# Patient Record
Sex: FEMALE | Race: WHITE | NOT HISPANIC OR LATINO
[De-identification: names, ages, dates, MRNs, and addresses within clinical notes are randomized per-mention and may not be internally consistent; named-entity substitution may affect disease eponyms.]

---
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
## 2025-03-18 ENCOUNTER — OFFICE VISIT (OUTPATIENT)
Dept: URGENT CARE | Facility: CLINIC | Age: 20
End: 2025-03-18
Payer: COMMERCIAL

## 2025-03-18 VITALS
HEART RATE: 89 BPM | WEIGHT: 125 LBS | TEMPERATURE: 98 F | DIASTOLIC BLOOD PRESSURE: 73 MMHG | BODY MASS INDEX: 23 KG/M2 | OXYGEN SATURATION: 98 % | SYSTOLIC BLOOD PRESSURE: 111 MMHG | HEIGHT: 62 IN | RESPIRATION RATE: 18 BRPM

## 2025-03-18 DIAGNOSIS — M79.675 TOE PAIN, LEFT: ICD-10-CM

## 2025-03-18 DIAGNOSIS — S92.402A CLOSED DISPLACED FRACTURE OF PHALANX OF LEFT GREAT TOE, UNSPECIFIED PHALANX, INITIAL ENCOUNTER: Primary | ICD-10-CM

## 2025-03-18 PROCEDURE — 99204 OFFICE O/P NEW MOD 45 MIN: CPT | Mod: S$GLB,,, | Performed by: NURSE PRACTITIONER

## 2025-03-18 PROCEDURE — 73630 X-RAY EXAM OF FOOT: CPT | Mod: LT,S$GLB,, | Performed by: RADIOLOGY

## 2025-03-18 NOTE — PROGRESS NOTES
"Subjective:      Patient ID: Tia Fraser is a 19 y.o. female.    Vitals:  height is 5' 2" (1.575 m) and weight is 56.7 kg (125 lb). Her oral temperature is 98 °F (36.7 °C). Her blood pressure is 111/73 and her pulse is 89. Her respiration is 18 and oxygen saturation is 98%.     Chief Complaint: Toe Injury    Pt presents complaints of a toe injury. Left toe. Injury occurred yesterday. Pt states she was at [employer] jumping around and hit her toe on the wall. Pt states swelling pain and bruising. Pain level 8. Pt states she can not apply pressure to the foot due to the pain to the toe. Pt state she can not bend her toe. OTC ice used and tylenol taken     Provider note begins below    Patient reports injuring her left great toe.  She reports swelling and bruising.  She has a history of fracture to that great toe.    Toe Injury  This is a new problem. The current episode started yesterday. The problem occurs constantly. The problem has been gradually worsening. Associated symptoms include arthralgias and joint swelling. Pertinent negatives include no abdominal pain, anorexia, change in bowel habit, chest pain, chills, congestion, coughing, diaphoresis, fatigue, fever, headaches, myalgias, nausea, neck pain, numbness, rash, sore throat, swollen glands, urinary symptoms, vertigo, visual change, vomiting or weakness. The symptoms are aggravated by walking, bending and standing. She has tried acetaminophen and ice for the symptoms. The treatment provided no relief.     Constitution: Negative for chills, sweating, fatigue and fever.   HENT:  Negative for congestion and sore throat.    Neck: Negative for neck pain.   Cardiovascular:  Negative for chest pain.   Respiratory:  Negative for cough.    Gastrointestinal:  Negative for abdominal pain, nausea and vomiting.   Musculoskeletal:  Positive for pain, trauma, joint pain, joint swelling and abnormal ROM of joint. Negative for muscle ache.   Skin:  Negative for rash. "   Neurological:  Negative for history of vertigo, headaches and numbness.      Objective:     Physical Exam   Constitutional: She is oriented to person, place, and time.   HENT:   Head: Normocephalic and atraumatic.   Cardiovascular: Normal rate.   Pulmonary/Chest: Effort normal. No respiratory distress.   Abdominal: Normal appearance.   Musculoskeletal:      Left foot: Left great toe: Exhibits decreased ROM, ecchymosis, swelling and tenderness. Left 2nd toe: Exhibits ecchymosis. Left 3rd toe: Exhibits ecchymosis. Left 4th toe: Exhibits ecchymosis.   Neurological: She is alert and oriented to person, place, and time.   Skin: Skin is warm and dry.   Psychiatric: Her behavior is normal. Mood normal.       XR FOOT COMPLETE 3 VIEW LEFT  Result Date: 3/18/2025  EXAMINATION: XR FOOT COMPLETE 3 VIEW LEFT CLINICAL HISTORY: .  Pain in left toe(s) TECHNIQUE: AP, lateral and oblique views of the left foot were performed. COMPARISON: None FINDINGS: Bones are well mineralized. Overall alignment is within normal limits.  Suspected recent fracture involving the base of the 1st distal phalanx with extension to the articular surface at the IP joint best seen on the oblique view.  No dislocation or destructive osseous process.  Lisfranc articulation is congruent.  Remaining joint spaces appear relatively maintained. No subcutaneous emphysema or radiopaque foreign body.     Suspected recent fracture of the 1st distal phalanx, as above. Electronically signed by: Kirill Arguello MD Date:    03/18/2025 Time:    16:02      Assessment:     1. Closed displaced fracture of phalanx of left great toe, unspecified phalanx, initial encounter    2. Toe pain, left        Plan:   Left great toe avulsion fracture   Referral to Podiatry   Ice, ibuprofen, rest, elevation,     Rest, Ice, Compress, & Elevate for 24 -48 hours.  No sports or high impact activities for 1-2 weeks  Wear the splint as directed.  Follow up with the orthopedist in 1 week if you  continue with pain.   Sometimes it can take up to 1 week for stress fractures to show up on an X-ray, and may need reimaging or a CT or MRI of the affected area.     Order for pneumatic walking boot placed for diagnosis of toe fracture, which will allow for proper stabilization and improved functionality of injury.       Closed displaced fracture of phalanx of left great toe, unspecified phalanx, initial encounter  -     Ambulatory referral/consult to Podiatry  -     Walking Boot For Home Use    Toe pain, left  -     XR FOOT COMPLETE 3 VIEW LEFT; Future; Expected date: 03/18/2025

## 2025-06-27 ENCOUNTER — APPOINTMENT (OUTPATIENT)
Dept: ORTHOPEDIC SURGERY | Facility: CLINIC | Age: 20
End: 2025-06-27

## 2025-06-27 VITALS — BODY MASS INDEX: 20.01 KG/M2 | HEIGHT: 61 IN | WEIGHT: 106 LBS

## 2025-06-27 PROCEDURE — 73564 X-RAY EXAM KNEE 4 OR MORE: CPT | Mod: 50

## 2025-06-27 PROCEDURE — 99204 OFFICE O/P NEW MOD 45 MIN: CPT | Mod: 25
